# Patient Record
Sex: FEMALE | Race: WHITE | HISPANIC OR LATINO | Employment: FULL TIME | URBAN - METROPOLITAN AREA
[De-identification: names, ages, dates, MRNs, and addresses within clinical notes are randomized per-mention and may not be internally consistent; named-entity substitution may affect disease eponyms.]

---

## 2024-01-24 DIAGNOSIS — B37.0 THRUSH: ICD-10-CM

## 2024-01-24 DIAGNOSIS — N30.00 ACUTE CYSTITIS WITHOUT HEMATURIA: Primary | ICD-10-CM

## 2024-01-24 RX ORDER — ONDANSETRON 4 MG/1
4 TABLET, ORALLY DISINTEGRATING ORAL EVERY 8 HOURS PRN
Qty: 30 TABLET | Refills: 1 | Status: SHIPPED | OUTPATIENT
Start: 2024-01-24 | End: 2024-02-13

## 2024-01-24 RX ORDER — AMOXICILLIN AND CLAVULANATE POTASSIUM 875; 125 MG/1; MG/1
875 TABLET, FILM COATED ORAL 2 TIMES DAILY
Qty: 10 TABLET | Refills: 0 | Status: SHIPPED | OUTPATIENT
Start: 2024-01-24 | End: 2024-01-24 | Stop reason: SDUPTHER

## 2024-01-24 RX ORDER — AMOXICILLIN AND CLAVULANATE POTASSIUM 875; 125 MG/1; MG/1
875 TABLET, FILM COATED ORAL 2 TIMES DAILY
Qty: 10 TABLET | Refills: 0 | Status: SHIPPED | OUTPATIENT
Start: 2024-01-24 | End: 2024-01-29

## 2024-01-31 RX ORDER — FLUCONAZOLE 150 MG/1
150 TABLET ORAL ONCE
Qty: 3 TABLET | Refills: 0 | Status: SHIPPED | OUTPATIENT
Start: 2024-01-31 | End: 2024-01-31

## 2025-04-23 ENCOUNTER — APPOINTMENT (OUTPATIENT)
Dept: PRIMARY CARE | Facility: CLINIC | Age: 25
End: 2025-04-23
Payer: COMMERCIAL

## 2025-04-23 VITALS
WEIGHT: 194 LBS | TEMPERATURE: 97.7 F | DIASTOLIC BLOOD PRESSURE: 78 MMHG | HEART RATE: 81 BPM | BODY MASS INDEX: 34.38 KG/M2 | HEIGHT: 63 IN | SYSTOLIC BLOOD PRESSURE: 112 MMHG

## 2025-04-23 DIAGNOSIS — M79.671 HEEL PAIN, BILATERAL: Primary | ICD-10-CM

## 2025-04-23 DIAGNOSIS — M79.672 HEEL PAIN, BILATERAL: Primary | ICD-10-CM

## 2025-04-23 DIAGNOSIS — Z00.00 WELLNESS EXAMINATION: ICD-10-CM

## 2025-04-23 DIAGNOSIS — M25.572 ARTHRALGIA OF BOTH ANKLES: ICD-10-CM

## 2025-04-23 DIAGNOSIS — E66.9 OBESITY (BMI 30-39.9): ICD-10-CM

## 2025-04-23 DIAGNOSIS — M25.571 ARTHRALGIA OF BOTH ANKLES: ICD-10-CM

## 2025-04-23 PROCEDURE — 99214 OFFICE O/P EST MOD 30 MIN: CPT | Performed by: FAMILY MEDICINE

## 2025-04-23 PROCEDURE — 3008F BODY MASS INDEX DOCD: CPT | Performed by: FAMILY MEDICINE

## 2025-04-23 PROCEDURE — 1036F TOBACCO NON-USER: CPT | Performed by: FAMILY MEDICINE

## 2025-04-23 RX ORDER — PREDNISONE 20 MG/1
TABLET ORAL
Qty: 18 TABLET | Refills: 0 | Status: SHIPPED | OUTPATIENT
Start: 2025-04-23

## 2025-04-23 ASSESSMENT — PATIENT HEALTH QUESTIONNAIRE - PHQ9
2. FEELING DOWN, DEPRESSED OR HOPELESS: NOT AT ALL
1. LITTLE INTEREST OR PLEASURE IN DOING THINGS: NOT AT ALL
SUM OF ALL RESPONSES TO PHQ9 QUESTIONS 1 AND 2: 0

## 2025-04-23 NOTE — PROGRESS NOTES
Foot pain, mainly left but also bilateral - x 2 years, getting worse   Typically when its cold out, during and after working out

## 2025-04-23 NOTE — PROGRESS NOTES
Patient has had off-and-on bilateral foot pain left worse than right for the last 2 years.  Patient does not really notice awakening or initial walking is actually worse the longer she goes.  Patient does have hardwood floors and does wear slippers.  She also occasionally has some ankle discomfort.  She was told that she potentially had gout couple years ago though the test was negative.  Her mom does have a lot of arthritis issues and she does have joint pains.    REVIEW OF SYSTEMS: 12 systems negative except for those mentioned in the HPI.    PHYSICAL EXAMINATION:   Constitutional: The patient is alert, in no acute  distress.  Eyes: Extraocular movements are intact.   ENT: external ear canals patent  Neck: no  JVD.  Heart: no JVD  Lungs: Normal respiration without stridor or nasal flaring   Psychiatric: Good judgment and insight. Normal affect and mood.  Skin: no rashes or lesions  MSK/neurologic: Cranial nerves II through XII grossly intact.  Point tenderness over the fat pad of the left heel.  Negative heel and ball squeeze.  Negative discomfort in the Achilles, ATFL.  5-5 motor lower extremities    Assessment:  per EMR    Plan:  At this point actually believe patient likely has injury to the fat pad.  Discussed icing as well as trying to decrease impact.  Will also use steroid we will go ahead and also do bilateral x-rays of the heels make sure she is not having bone spurs would benefit from injection or surgery if not improved would also send to podiatry or ankle.  Also CBC CMP A1c lipid thyroid rheumatoid, JANICE uric acid with family history of arthritis and possible potential gout    This dictation was created using Dragon dictation and may contain errors

## 2025-04-26 ENCOUNTER — HOSPITAL ENCOUNTER (OUTPATIENT)
Dept: RADIOLOGY | Facility: CLINIC | Age: 25
Discharge: HOME | End: 2025-04-26
Payer: COMMERCIAL

## 2025-04-26 DIAGNOSIS — M25.571 ARTHRALGIA OF BOTH ANKLES: ICD-10-CM

## 2025-04-26 DIAGNOSIS — M79.672 HEEL PAIN, BILATERAL: ICD-10-CM

## 2025-04-26 DIAGNOSIS — Z00.00 WELLNESS EXAMINATION: ICD-10-CM

## 2025-04-26 DIAGNOSIS — M25.572 ARTHRALGIA OF BOTH ANKLES: ICD-10-CM

## 2025-04-26 DIAGNOSIS — M79.671 HEEL PAIN, BILATERAL: ICD-10-CM

## 2025-04-26 PROCEDURE — 73630 X-RAY EXAM OF FOOT: CPT | Mod: BILATERAL PROCEDURE | Performed by: RADIOLOGY

## 2025-04-26 PROCEDURE — 73630 X-RAY EXAM OF FOOT: CPT | Mod: 50

## 2025-05-04 LAB
25(OH)D3+25(OH)D2 SERPL-MCNC: 22 NG/ML (ref 30–100)
ALBUMIN SERPL-MCNC: 4.3 G/DL (ref 3.6–5.1)
ALP SERPL-CCNC: 74 U/L (ref 31–125)
ALT SERPL-CCNC: 19 U/L (ref 6–29)
ANA SER QL IF: NORMAL
ANION GAP SERPL CALCULATED.4IONS-SCNC: 12 MMOL/L (CALC) (ref 7–17)
AST SERPL-CCNC: 15 U/L (ref 10–30)
BASOPHILS # BLD AUTO: 54 CELLS/UL (ref 0–200)
BASOPHILS NFR BLD AUTO: 0.3 %
BILIRUB SERPL-MCNC: 0.5 MG/DL (ref 0.2–1.2)
BUN SERPL-MCNC: 16 MG/DL (ref 7–25)
CALCIUM SERPL-MCNC: 9.6 MG/DL (ref 8.6–10.2)
CHLORIDE SERPL-SCNC: 104 MMOL/L (ref 98–110)
CHOLEST SERPL-MCNC: 169 MG/DL
CHOLEST/HDLC SERPL: 2.8 (CALC)
CO2 SERPL-SCNC: 22 MMOL/L (ref 20–32)
CREAT SERPL-MCNC: 0.66 MG/DL (ref 0.5–0.96)
EGFRCR SERPLBLD CKD-EPI 2021: 123 ML/MIN/1.73M2
EOSINOPHIL # BLD AUTO: 181 CELLS/UL (ref 15–500)
EOSINOPHIL NFR BLD AUTO: 1 %
ERYTHROCYTE [DISTWIDTH] IN BLOOD BY AUTOMATED COUNT: 12.2 % (ref 11–15)
EST. AVERAGE GLUCOSE BLD GHB EST-MCNC: 103 MG/DL
EST. AVERAGE GLUCOSE BLD GHB EST-SCNC: 5.7 MMOL/L
GLUCOSE SERPL-MCNC: 90 MG/DL (ref 65–99)
HBA1C MFR BLD: 5.2 %
HCT VFR BLD AUTO: 43.6 % (ref 35–45)
HDLC SERPL-MCNC: 60 MG/DL
HGB BLD-MCNC: 14.7 G/DL (ref 11.7–15.5)
LDLC SERPL CALC-MCNC: 95 MG/DL (CALC)
LYMPHOCYTES # BLD AUTO: 3819 CELLS/UL (ref 850–3900)
LYMPHOCYTES NFR BLD AUTO: 21.1 %
MCH RBC QN AUTO: 29.4 PG (ref 27–33)
MCHC RBC AUTO-ENTMCNC: 33.7 G/DL (ref 32–36)
MCV RBC AUTO: 87.2 FL (ref 80–100)
MONOCYTES # BLD AUTO: 923 CELLS/UL (ref 200–950)
MONOCYTES NFR BLD AUTO: 5.1 %
NEUTROPHILS # BLD AUTO: ABNORMAL CELLS/UL (ref 1500–7800)
NEUTROPHILS NFR BLD AUTO: 72.5 %
NONHDLC SERPL-MCNC: 109 MG/DL (CALC)
PLATELET # BLD AUTO: 483 THOUSAND/UL (ref 140–400)
PMV BLD REES-ECKER: 9.5 FL (ref 7.5–12.5)
POTASSIUM SERPL-SCNC: 4.6 MMOL/L (ref 3.5–5.3)
PROT SERPL-MCNC: 7.8 G/DL (ref 6.1–8.1)
RBC # BLD AUTO: 5 MILLION/UL (ref 3.8–5.1)
RHEUMATOID FACT SERPL-ACNC: NORMAL [IU]/ML
SODIUM SERPL-SCNC: 138 MMOL/L (ref 135–146)
TRIGL SERPL-MCNC: 48 MG/DL
TSH SERPL-ACNC: 1.08 MIU/L
URATE SERPL-MCNC: 4.1 MG/DL (ref 2.5–7)
WBC # BLD AUTO: 18.1 THOUSAND/UL (ref 3.8–10.8)

## 2025-05-07 DIAGNOSIS — R76.8 ANA POSITIVE: Primary | ICD-10-CM

## 2025-05-07 LAB
25(OH)D3+25(OH)D2 SERPL-MCNC: 22 NG/ML (ref 30–100)
ALBUMIN SERPL-MCNC: 4.3 G/DL (ref 3.6–5.1)
ALP SERPL-CCNC: 74 U/L (ref 31–125)
ALT SERPL-CCNC: 19 U/L (ref 6–29)
ANA PAT SER IF-IMP: ABNORMAL
ANA PAT SER IF-IMP: ABNORMAL
ANA SER QL IF: POSITIVE
ANA TITR SER IF: ABNORMAL TITER
ANA TITR SER IF: ABNORMAL TITER
ANION GAP SERPL CALCULATED.4IONS-SCNC: 12 MMOL/L (CALC) (ref 7–17)
AST SERPL-CCNC: 15 U/L (ref 10–30)
BASOPHILS # BLD AUTO: 54 CELLS/UL (ref 0–200)
BASOPHILS NFR BLD AUTO: 0.3 %
BILIRUB SERPL-MCNC: 0.5 MG/DL (ref 0.2–1.2)
BUN SERPL-MCNC: 16 MG/DL (ref 7–25)
CALCIUM SERPL-MCNC: 9.6 MG/DL (ref 8.6–10.2)
CENTROMERE B AB SER-ACNC: ABNORMAL AI
CHLORIDE SERPL-SCNC: 104 MMOL/L (ref 98–110)
CHOLEST SERPL-MCNC: 169 MG/DL
CHOLEST/HDLC SERPL: 2.8 (CALC)
CO2 SERPL-SCNC: 22 MMOL/L (ref 20–32)
CREAT SERPL-MCNC: 0.66 MG/DL (ref 0.5–0.96)
DSDNA AB SER-ACNC: 1 IU/ML
EGFRCR SERPLBLD CKD-EPI 2021: 123 ML/MIN/1.73M2
ENA JO1 AB SER IA-ACNC: ABNORMAL AI
ENA RNP AB SER-ACNC: ABNORMAL AI
ENA SCL70 AB SER IA-ACNC: ABNORMAL AI
ENA SM AB SER IA-ACNC: ABNORMAL AI
ENA SM+RNP AB SER IA-ACNC: ABNORMAL AI
ENA SS-A AB SER IA-ACNC: ABNORMAL AI
ENA SS-B AB SER IA-ACNC: ABNORMAL AI
EOSINOPHIL # BLD AUTO: 181 CELLS/UL (ref 15–500)
EOSINOPHIL NFR BLD AUTO: 1 %
ERYTHROCYTE [DISTWIDTH] IN BLOOD BY AUTOMATED COUNT: 12.2 % (ref 11–15)
EST. AVERAGE GLUCOSE BLD GHB EST-MCNC: 103 MG/DL
EST. AVERAGE GLUCOSE BLD GHB EST-SCNC: 5.7 MMOL/L
GLUCOSE SERPL-MCNC: 90 MG/DL (ref 65–99)
HBA1C MFR BLD: 5.2 %
HCT VFR BLD AUTO: 43.6 % (ref 35–45)
HDLC SERPL-MCNC: 60 MG/DL
HGB BLD-MCNC: 14.7 G/DL (ref 11.7–15.5)
LABORATORY COMMENT REPORT: ABNORMAL
LDLC SERPL CALC-MCNC: 95 MG/DL (CALC)
LYMPHOCYTES # BLD AUTO: 3819 CELLS/UL (ref 850–3900)
LYMPHOCYTES NFR BLD AUTO: 21.1 %
MCH RBC QN AUTO: 29.4 PG (ref 27–33)
MCHC RBC AUTO-ENTMCNC: 33.7 G/DL (ref 32–36)
MCV RBC AUTO: 87.2 FL (ref 80–100)
MONOCYTES # BLD AUTO: 923 CELLS/UL (ref 200–950)
MONOCYTES NFR BLD AUTO: 5.1 %
NEUTROPHILS # BLD AUTO: ABNORMAL CELLS/UL (ref 1500–7800)
NEUTROPHILS NFR BLD AUTO: 72.5 %
NONHDLC SERPL-MCNC: 109 MG/DL (CALC)
NUCLEOSOME AB SER IA-ACNC: ABNORMAL AI
PLATELET # BLD AUTO: 483 THOUSAND/UL (ref 140–400)
PMV BLD REES-ECKER: 9.5 FL (ref 7.5–12.5)
POTASSIUM SERPL-SCNC: 4.6 MMOL/L (ref 3.5–5.3)
PROT SERPL-MCNC: 7.8 G/DL (ref 6.1–8.1)
RBC # BLD AUTO: 5 MILLION/UL (ref 3.8–5.1)
RHEUMATOID FACT SERPL-ACNC: <10 IU/ML
RIBOSOMAL P AB SER-ACNC: ABNORMAL AI
SODIUM SERPL-SCNC: 138 MMOL/L (ref 135–146)
TRIGL SERPL-MCNC: 48 MG/DL
TSH SERPL-ACNC: 1.08 MIU/L
URATE SERPL-MCNC: 4.1 MG/DL (ref 2.5–7)
WBC # BLD AUTO: 18.1 THOUSAND/UL (ref 3.8–10.8)

## 2025-07-11 NOTE — PROGRESS NOTES
Subjective   Patient ID: [id number]   Pedro Pablo Castro is a 25 y.o. female who presents for positive JANICE, referred by her PCP Dr. To    Current rheum meds:  - Prednisone?    Previous rheum meds:  -    HPI  Feet pain, JANICE 1:40, referred to rheum  Bilateral heel pains for the past 2 years, with activity  Feels like a burning sensation in her feet after she walks a certain number of steps   2.5 years ago, left foot pain, dorsum, pain was bad enough to go to the ED, they told her it is gout and gave her a medrol pack, no colchicine or allopurinol  Dr. To gave her prednisone for the heel, a taper, didn't feel any difference with it  No podiatry referrals yet or PT    PSH: No MSK surgeries   Allergies: NKDA   Habits: No smoking, no alcohol, no recreational drugs   Social hx: , 3 children, , more a desk job     ROS:  Constitutional: Denies fever, chills, weight loss, night sweats or headaches  Eyes: Denies dry eyes, blurry vision, redness or pain  ENT: Denies dry mouth, dental loss, loss of taste, nasal or oral ulcers, jaw claudication, difficulty swallowing, nasal crusting or recurrent sinus infections   Cardiovascular: Denies chest pain, palpitations, orthopnea  Respiratory: Denies shortness of breath, cough, asthma, or recurrent respiratory infections  Gastrointestinal: Denies dysphagia, nausea, vomiting, heartburn, abdominal pain, constipation, diarrhea, melena or hematochezia  Genitourinary: No recurrent urinary infections or STDs, no genital or anal ulcers  Integumentary: Denies photosensitivity, rash or lesions, Raynaud's phenomenon, skin tightening, digital ulcers, psoriatic lesions, or alopecia  Neurological: Denies any numbness or tingling, muscle weakness, or incontinence   Hematologic/Lymphatic: Denies bleeding, bruising, history of clots (arterial or venous), or abortions/miscarriages/pregnancy complications   MSK: No joint pains, redness, hotness or swelling. No inflammatory back pain,  enthesitis, dactylitis. No morning stiffness   Muscular: Denies weakness, difficulty rising from chair or combing the hair, muscle aches, or problems with hand    FHx: Family members on her mom's side have Crohn's     Problem List[1]     Medical History[2]     Surgical History[3]     Social History     Socioeconomic History    Marital status: Single     Spouse name: Not on file    Number of children: Not on file    Years of education: Not on file    Highest education level: Not on file   Occupational History    Not on file   Tobacco Use    Smoking status: Never    Smokeless tobacco: Never   Vaping Use    Vaping status: Never Used   Substance and Sexual Activity    Alcohol use: Never    Drug use: Never    Sexual activity: Not on file   Other Topics Concern    Not on file   Social History Narrative    Not on file     Social Drivers of Health     Financial Resource Strain: Not on File (2019)    Received from [facility]    Financial Resource Strain     Financial Resource Strain: 0   Food Insecurity: Not on File (2019)    Received from [facility]    Food Insecurity     Food: 0   Transportation Needs: Not on File (2019)    Received from [facility]    Transportation Needs     Transportation: 0   Physical Activity: Not on File (2019)    Received from [facility]    Physical Activity     Physical Activity: 0   Stress: Not on File (2019)    Received from [facility]    Stress     Stress: 0   Social Connections: Not on File (2019)    Received from [facility]    Social Connections     Social Connections and Isolation: 0   Intimate Partner Violence: Not on file   Housing Stability: Not on File (2019)    Received from [facility]    Housing Stability     Housin      RX Allergies[4]     Current Medications[5]     Objective   Visit Vitals  /75   Pulse 76   Temp 36.6 °C (97.8 °F)   Resp 20   Wt 88.9 kg (196 lb)   BMI 35.28 kg/m²   Smoking Status Never   BSA 1.98 m²      Physical Exam:  General: AAOx3, Cooperative  Head:  "normocephalic, atraumatic, no hair loss   Eyes: EOMI, conjunctiva clear, sclera white, anicteric  Ears: hearing intact  Nose: no deformity, no crusting   Throat/Mouth: No oral deformities, no cheek swelling, mucosa appear moist, no oral ulcers noted or loss of dentition   Skin: No rashes, ulcers or photosensitive areas  MSK: Upper Extremities:  Hand/Fingers: No erythema, edema, tenderness or warmth at DIP, PIP, or MCP joints, FROM grossly. Good hand . No nodules. No deformities   Wrists: No erythema, edema, warmth or tenderness at wrist, FROM grossly  Elbows: No tenderness, edema, erythema or warmth at elbows, FROM grossly. No nodules   Shoulders: No edema, erythema, tenderness or warmth at shoulders. FROM  Lower Extremities:   Hips: No obvious deformities. No joint tenderness, normal ROM grossly. No trochanteric bursae TTP  Knees: No tenderness, deformities, edema, rashes, or warmth, normal ROM grossly. No crepitus, no pes anserine bursa TTP   Ankles, feet: No heel or achilles' tendon insertion TTP. No deformities, tenderness, edema, erythema, ulceration, or warmth at the ankle or MTP/IP joints, normal ROM grossly  Spine: No spinal tenderness to palpation. No SI joint tenderness      Lab Results   Component Value Date    WBC 18.1 (H) 05/03/2025    HGB 14.7 05/03/2025    HCT 43.6 05/03/2025    MCV 87.2 05/03/2025     (H) 05/03/2025        Chemistry    Lab Results   Component Value Date/Time     05/03/2025 0900    K 4.6 05/03/2025 0900     05/03/2025 0900    CO2 22 05/03/2025 0900    BUN 16 05/03/2025 0900    CREATININE 0.66 05/03/2025 0900    Lab Results   Component Value Date/Time    CALCIUM 9.6 05/03/2025 0900    ALKPHOS 74 05/03/2025 0900    AST 15 05/03/2025 0900    ALT 19 05/03/2025 0900    BILITOT 0.5 05/03/2025 0900      No results found for: \"CRP\"   Lab Results   Component Value Date    JANICE POSITIVE (A) 05/03/2025      Lab Results   Component Value Date    ALT 19 05/03/2025    AST 15 " 05/03/2025    ALKPHOS 74 05/03/2025    BILITOT 0.5 05/03/2025     Lab Results   Component Value Date    URICACID 4.1 05/03/2025      Lab Results   Component Value Date    CALCIUM 9.6 05/03/2025     XR foot 3+ views bilateral  Narrative: Interpreted By:  Nemo Ruano,   STUDY:  XR FOOT 3+ VIEWS BILATERAL;  4/26/2025 12:03 pm      INDICATION:  Signs/Symptoms:heel pain x2 years.      COMPARISON:  None.      ACCESSION NUMBER(S):  [id number]      ORDERING CLINICIAN:  BRYCE TO      FINDINGS:      No acute fracture or dislocation is seen.      No soft tissue swelling is identified. No erosions or periosteal  reaction is seen.      No radiopaque foreign bodies are seen.      Impression: Normal exam of bilateral feet.          MACRO:  None      Signed by: Nemo Ruano 4/28/2025 7:47 PM  Dictation workstation:   [id number]     === 04/26/25 ===  XR FOOT 3+ VIEWS BILATERAL  Normal exam of bilateral feet       Assessment/Plan    This is a 25 y.o. female who presents for positive JANICE, referred by her PCP Dr. To    Patient has mechanical heel pain, typical of plantar fasciitis. JANICE 1:40. Xrays of the feet, normal. Will complete the work up. If negative, will refer to podiatry. She has  family hx of Crohn's disease    Labs:  5/25: WBCs 18K, PLT 483K, rest of CBC, CMP, TSH, UA wnl. Vit D 22  JANICE 1:40, TRIP panel, RF neg    Imaging:  Xray feet: Normal    Xray ankles: Normal     # JANICE 1:40, read as positive but is actually negative  # Chronic feet pain    - Labs today  - US feet to evaluate for plantar fasciitis vs inflammation  - I will inform the patient of the results and if she needs podiatry referral  - Given exercises for the heels to help the pain     RTC in 1 month for discussion of results     Plan, including risks and benefits, was discussed with the patient, informed on how to reach us.     To schedule an appointment, call [phone]  To reach the rheumatology office, call [phone]    Kavitha Lerma MD       Division of Rheumatology  [facility]          [1]   Patient Active Problem List  Diagnosis    Heel pain, bilateral    Obesity (BMI 30-39.9)   [2] History reviewed. No pertinent past medical history.  [3]   Past Surgical History:  Procedure Laterality Date    OTHER SURGICAL HISTORY  01/26/2015    Prior Surgical Procedure Not Done   [4] No Known Allergies  [5]   Current Outpatient Medications:     predniSONE (Deltasone) 20 mg tablet, Take 3 tabs (60mg) daily for 3 days, then take 2 tabs (40mg) daily for 3 days, then take 1 tab (20mg) daily for 3 days., Disp: 18 tablet, Rfl: 0

## 2025-07-21 ENCOUNTER — APPOINTMENT (OUTPATIENT)
Dept: RHEUMATOLOGY | Facility: CLINIC | Age: 25
End: 2025-07-21
Payer: COMMERCIAL

## 2025-07-21 VITALS
HEART RATE: 76 BPM | RESPIRATION RATE: 20 BRPM | DIASTOLIC BLOOD PRESSURE: 75 MMHG | BODY MASS INDEX: 35.28 KG/M2 | TEMPERATURE: 97.8 F | SYSTOLIC BLOOD PRESSURE: 115 MMHG | WEIGHT: 196 LBS

## 2025-07-21 DIAGNOSIS — M79.671 CHRONIC PAIN OF BOTH FEET: Primary | ICD-10-CM

## 2025-07-21 DIAGNOSIS — R76.8 POSITIVE ANA (ANTINUCLEAR ANTIBODY): ICD-10-CM

## 2025-07-21 DIAGNOSIS — M79.672 CHRONIC PAIN OF BOTH FEET: Primary | ICD-10-CM

## 2025-07-21 DIAGNOSIS — G89.29 CHRONIC PAIN OF BOTH FEET: Primary | ICD-10-CM

## 2025-07-21 PROCEDURE — 99244 OFF/OP CNSLTJ NEW/EST MOD 40: CPT | Performed by: STUDENT IN AN ORGANIZED HEALTH CARE EDUCATION/TRAINING PROGRAM

## 2025-07-21 PROCEDURE — 99204 OFFICE O/P NEW MOD 45 MIN: CPT | Performed by: STUDENT IN AN ORGANIZED HEALTH CARE EDUCATION/TRAINING PROGRAM

## 2025-07-21 RX ORDER — CHOLECALCIFEROL (VITAMIN D3) 50 MCG
50 TABLET ORAL DAILY
Qty: 360 TABLET | Refills: 0 | Status: SHIPPED | OUTPATIENT
Start: 2025-07-21 | End: 2026-07-21

## 2025-07-25 LAB
CCP IGG SERPL-ACNC: <16 UNITS
CRP SERPL-MCNC: 6.7 MG/L
ERYTHROCYTE [SEDIMENTATION RATE] IN BLOOD BY WESTERGREN METHOD: NORMAL MM/H
HLA-B27 QL NAA+PROBE: NEGATIVE
QUEST HLAB27 TYPING RESULTS REVIEWED BY:: NORMAL
WBC # BLD AUTO: NORMAL THOUSAND/UL

## 2025-09-22 ENCOUNTER — APPOINTMENT (OUTPATIENT)
Dept: RHEUMATOLOGY | Facility: CLINIC | Age: 25
End: 2025-09-22
Payer: COMMERCIAL